# Patient Record
Sex: MALE | Race: WHITE | HISPANIC OR LATINO
[De-identification: names, ages, dates, MRNs, and addresses within clinical notes are randomized per-mention and may not be internally consistent; named-entity substitution may affect disease eponyms.]

---

## 2019-01-17 ENCOUNTER — HOSPITAL (OUTPATIENT)
Dept: OTHER | Age: 19
End: 2019-01-17

## 2019-01-17 LAB
ALBUMIN SERPL-MCNC: 3.5 GM/DL (ref 3.6–5.1)
ALBUMIN/GLOB SERPL: 1.2 {RATIO} (ref 1–2.4)
ALP SERPL-CCNC: 60 UNIT/L (ref 45–117)
ALT SERPL-CCNC: 122 UNIT/L
ANALYZER ANC (IANC): ABNORMAL
ANION GAP SERPL CALC-SCNC: 11 MMOL/L (ref 10–20)
AST SERPL-CCNC: 91 UNIT/L
BASOPHILS # BLD: 0 THOUSAND/MCL (ref 0–0.3)
BASOPHILS NFR BLD: 0 %
BILIRUB SERPL-MCNC: 0.6 MG/DL (ref 0.2–1)
BUN SERPL-MCNC: 10 MG/DL (ref 6–20)
BUN/CREAT SERPL: 9 (ref 7–25)
CALCIUM SERPL-MCNC: 8 MG/DL (ref 8.4–10.2)
CHLORIDE: 110 MMOL/L (ref 98–107)
CO2 SERPL-SCNC: 25 MMOL/L (ref 21–32)
CREAT SERPL-MCNC: 1.09 MG/DL (ref 0.67–1.17)
DIFFERENTIAL METHOD BLD: ABNORMAL
EOSINOPHIL # BLD: 0 THOUSAND/MCL (ref 0.1–0.5)
EOSINOPHIL NFR BLD: 0 %
ERYTHROCYTE [DISTWIDTH] IN BLOOD: 11.9 % (ref 11–15)
GLOBULIN SER-MCNC: 3 GM/DL (ref 2–4)
GLUCOSE SERPL-MCNC: 105 MG/DL (ref 65–99)
HEMATOCRIT: 40.7 % (ref 39–51)
HGB BLD-MCNC: 14.2 GM/DL (ref 13–17)
IMM GRANULOCYTES # BLD AUTO: 0 THOUSAND/MCL (ref 0–0.2)
IMM GRANULOCYTES NFR BLD: 1 %
LIPASE SERPL-CCNC: 180 UNIT/L (ref 73–393)
LYMPHOCYTES # BLD: 0.7 THOUSAND/MCL (ref 1.2–5.2)
LYMPHOCYTES NFR BLD: 14 %
MCH RBC QN AUTO: 31.4 PG (ref 26–34)
MCHC RBC AUTO-ENTMCNC: 34.9 GM/DL (ref 32–36.5)
MCV RBC AUTO: 90 FL (ref 78–100)
MONOCYTES # BLD: 0.9 THOUSAND/MCL (ref 0.3–0.9)
MONOCYTES NFR BLD: 19 %
NEUTROPHILS # BLD: 3 THOUSAND/MCL (ref 1.8–8)
NEUTROPHILS NFR BLD: 66 %
NEUTS SEG NFR BLD: ABNORMAL %
NRBC (NRBCRE): 0 /100 WBC
PLATELET # BLD: 150 THOUSAND/MCL (ref 140–450)
POTASSIUM SERPL-SCNC: 3.6 MMOL/L (ref 3.4–5.1)
PROT SERPL-MCNC: 6.5 GM/DL (ref 6.4–8.2)
RBC # BLD: 4.52 MILLION/MCL (ref 4.5–5.9)
SODIUM SERPL-SCNC: 142 MMOL/L (ref 135–145)
TROPONIN I SERPL HS-MCNC: <0.02 NG/ML
WBC # BLD: 4.6 THOUSAND/MCL (ref 4.2–11)

## 2019-06-13 ENCOUNTER — OFFICE VISIT (OUTPATIENT)
Dept: PEDIATRICS | Age: 19
End: 2019-06-13

## 2019-06-13 VITALS
HEIGHT: 68 IN | HEART RATE: 64 BPM | WEIGHT: 185 LBS | SYSTOLIC BLOOD PRESSURE: 120 MMHG | DIASTOLIC BLOOD PRESSURE: 65 MMHG | BODY MASS INDEX: 28.04 KG/M2

## 2019-06-13 DIAGNOSIS — Z00.00 WELL ADULT EXAM: Primary | ICD-10-CM

## 2019-06-13 PROCEDURE — 99385 PREV VISIT NEW AGE 18-39: CPT | Performed by: NURSE PRACTITIONER

## 2019-06-13 RX ORDER — FLUTICASONE PROPIONATE 50 MCG
2 SPRAY, SUSPENSION (ML) NASAL DAILY
COMMUNITY
Start: 2016-09-29

## 2019-06-13 RX ORDER — ALBUTEROL SULFATE 90 UG/1
1 AEROSOL, METERED RESPIRATORY (INHALATION)
COMMUNITY
Start: 2019-02-06

## 2019-06-13 ASSESSMENT — VISUAL ACUITY
OS_CC: 20/25
OD_CC: 20/25

## 2024-03-07 ENCOUNTER — OFFICE VISIT (OUTPATIENT)
Dept: SURGERY | Age: 24
End: 2024-03-07

## 2024-03-07 VITALS
BODY MASS INDEX: 27.92 KG/M2 | DIASTOLIC BLOOD PRESSURE: 74 MMHG | TEMPERATURE: 97.7 F | SYSTOLIC BLOOD PRESSURE: 119 MMHG | HEIGHT: 70 IN | HEART RATE: 58 BPM | WEIGHT: 195 LBS

## 2024-03-07 DIAGNOSIS — M79.651 RIGHT THIGH PAIN: Primary | ICD-10-CM

## 2024-03-07 PROCEDURE — 99203 OFFICE O/P NEW LOW 30 MIN: CPT | Performed by: SURGERY

## 2024-03-07 ASSESSMENT — ENCOUNTER SYMPTOMS
CHEST TIGHTNESS: 0
COLOR CHANGE: 0
FATIGUE: 0
TROUBLE SWALLOWING: 0
DIZZINESS: 0
WEAKNESS: 0
ABDOMINAL DISTENTION: 0
BLOOD IN STOOL: 0
SHORTNESS OF BREATH: 0
FEVER: 0
LIGHT-HEADEDNESS: 0
WOUND: 0
UNEXPECTED WEIGHT CHANGE: 0
ABDOMINAL PAIN: 0
CONSTIPATION: 0
COUGH: 0
APPETITE CHANGE: 0
VOMITING: 0
NAUSEA: 0
EYE REDNESS: 0
BACK PAIN: 0

## 2024-03-07 ASSESSMENT — PAIN SCALES - GENERAL: PAINLEVEL: 3

## 2025-04-23 ENCOUNTER — APPOINTMENT (OUTPATIENT)
Dept: CT IMAGING | Facility: HOSPITAL | Age: 25
End: 2025-04-23
Attending: EMERGENCY MEDICINE
Payer: COMMERCIAL

## 2025-04-23 ENCOUNTER — HOSPITAL ENCOUNTER (EMERGENCY)
Facility: HOSPITAL | Age: 25
Discharge: HOME | End: 2025-04-24
Attending: EMERGENCY MEDICINE
Payer: COMMERCIAL

## 2025-04-23 DIAGNOSIS — S09.90XA INJURY OF HEAD, INITIAL ENCOUNTER: Primary | ICD-10-CM

## 2025-04-23 DIAGNOSIS — S01.91XA LACERATION OF HEAD WITHOUT FOREIGN BODY, UNSPECIFIED PART OF HEAD, INITIAL ENCOUNTER: ICD-10-CM

## 2025-04-23 PROCEDURE — 12011 RPR F/E/E/N/L/M 2.5 CM/<: CPT

## 2025-04-23 PROCEDURE — 99284 EMERGENCY DEPT VISIT MOD MDM: CPT

## 2025-04-23 PROCEDURE — 70450 CT HEAD/BRAIN W/O DYE: CPT | Performed by: EMERGENCY MEDICINE

## 2025-04-24 VITALS
HEART RATE: 61 BPM | BODY MASS INDEX: 27.28 KG/M2 | TEMPERATURE: 98 F | SYSTOLIC BLOOD PRESSURE: 136 MMHG | HEIGHT: 68 IN | RESPIRATION RATE: 17 BRPM | WEIGHT: 180 LBS | DIASTOLIC BLOOD PRESSURE: 90 MMHG | OXYGEN SATURATION: 100 %

## 2025-04-24 NOTE — ED QUICK NOTES
Patient discharged home in no acute distress. A&Ox4, skin p/w/d, denies cp/sob. Ambulating with steady gait and verbalized understanding of d/c instructions

## 2025-04-24 NOTE — ED INITIAL ASSESSMENT (HPI)
Patient to ED after being hit with 110mph baseball. Pt is  and the ball hit off machine and directly to the left side of head near left ear causing 1 cm laceration. PT AO 4. Denies any LOC

## 2025-04-24 NOTE — DISCHARGE INSTRUCTIONS
Thank you for seeking care at [facility] Emergency Department.  You have been seen and evaluated after a closed head injury. The CT scan today was normal. It is possible you may have sustained a concussion.    We discussed the results of your workup   Please read the instructions provided   If given prescriptions, take as instructed    Avoid intense exercise and activities where there is a risk of another head injury - hitting the head or getting a jolt to the head - for at least 2 weeks AND until symptoms resolve. Light activity and exercise is okay, however you should rest your brain and body for 1 week. That means avoiding screen time from your phone, laptop, or tablet, etc. You should have a gradual return to regular activities and should stop if the activity is causing worsening symptoms such as headaches, confusion, lightheadedness, memory loss, vision changes or other concussion symptoms that you are having. Eat a regular diet with plenty of liquids and rest.     Remember, your care process does not end after your visit today. Please follow-up with your doctor within 1-2 days for a follow-up check to ensure you are  improving, to see if you need any further evaluation/testing, or to evaluate for any alternate diagnoses.    Please return to the emergency department if you develop severe or progressively worsening headache, persistent nausea or recurrent vomiting, obvious changes in behavior or abnormal behavior, vision changes, speech changes, difficulty walking, new or worsening numbness, tingling or weakness, fevers, feeling very ill or if any concerning or urgent problem develops or if you develop any other new or concerning symptoms as these could be signs of more serious medical illness.    Return with any signs of infection to your laceration. The steri-strips will fall off on their own.

## 2025-04-24 NOTE — ED PROVIDER NOTES
[place] Emergency Department Note  Patient: Sheldon Gonzalez Age: 24 year old Sex: male      MRN: [id number]  : 2000    Patient Seen in: [facility] Emergency Department    History     Chief Complaint   Patient presents with    Head Injury     Stated Complaint: head injury    History obtained from: patient     Otherwise healthy 24 year old male presents to the ER for evaluation after sustaining a head injury. 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  He did not pass out but did briefly fall to the ground.  He denies loss of consciousness, dizziness, lightheadedness, vision changes, lightheadedness, neck pain, numbness weakness or tingling in his extremities no other complaints.  Reports mild pain just above his ear and jaw on that side but states it is minimal.  Denies any other injuries. He believes he is up to date on his tetanus vaccine from when he was 16.     Review of Systems:  Review of Systems  Positive for stated complaint: head injury. Constitutional and vital signs reviewed. All other systems reviewed and negative except as noted above.    Patient History:  Past Medical History[1]    Past Surgical History[2]     Family History[3]    Specific Social Determinants of Health:   Short Social Hx on File[4]        PSFH elements reviewed from today and agreed except as otherwise stated in HPI.    Physical Exam     ED Triage Vitals [25 2224]   /90   Pulse 61   Resp 17   Temp 97.7 °F (36.5 °C)   Temp src Oral   SpO2 100 %   O2 Device None (Room air)       Current:/90   Pulse 61   Temp 97.7 °F (36.5 °C) (Oral)   Resp 17   Ht 172.7 cm (5' 8\")   Wt 81.6 kg   SpO2 100%   BMI 27.37 kg/m²         Physical Exam  Vitals and nursing note reviewed.   Constitutional:       General: He is not in acute distress.     Appearance:  He is not ill-appearing.   HENT:      Head: Normocephalic.      Comments: Approximately 1.5 cm well-approximated superficial linear laceration just anterior to the left ear, no active bleeding, no hematoma     Right Ear: External ear normal.      Left Ear: External ear normal.      Ears:      Comments: No hemotympanum bilaterally     Mouth/Throat:      Mouth: Mucous membranes are moist.      Pharynx: Oropharynx is clear.   Eyes:      Extraocular Movements: Extraocular movements intact.      Conjunctiva/sclera: Conjunctivae normal.      Pupils: Pupils are equal, round, and reactive to light.   Cardiovascular:      Rate and Rhythm: Normal rate and regular rhythm.      Heart sounds: No murmur heard.     Comments: 2+ radial and dp pulses bilaterally   Pulmonary:      Effort: Pulmonary effort is normal. No respiratory distress.      Breath sounds: No wheezing or rales.   Abdominal:      General: There is no distension.      Palpations: Abdomen is soft.      Tenderness: There is no abdominal tenderness. There is no guarding or rebound.   Musculoskeletal:         General: No deformity.      Cervical back: Normal range of motion and neck supple. No rigidity or tenderness.   Skin:     General: Skin is warm and dry.      Capillary Refill: Capillary refill takes less than 2 seconds.   Neurological:      General: No focal deficit present.      Mental Status: He is alert and oriented to person, place, and time.      Cranial Nerves: No cranial nerve deficit.      Comments: Strength is 5/5 bilateral upper extremities on handgrip, elbow flexion/extension, shoulder lateral abduction/adduction, bilateral lower extremities on hip flexion and ankle plantar/dorsiflexion. SILT bilat UE and LE throughout and symmetric. Finger to nose intact bilaterally.          ED Course   Labs:   Labs Reviewed - No data to display  Radiology findings:    No results found.      MDM   This patient presents with head injury after being struck by a  baseball.  Denies significant complaints right now, has small superficial linear laceration anterior to his ear as described, states he is up-to-date on tetanus    Differential diagnoses considered includes, but is not limited to:   Laceration  Closed head injury  Concussion  Skull fracture  Less likely but considered ICH given injury mechanism     Will obtain the following tests: CT head  Will administer the following medications/therapies: pt declines pain meds     Please see ED course for my independent review of these tests/imaging results.    Chronic conditions affecting care: n/a     Workup and medications considered but not ordered: n/a     Social Determinants of Health that impacted care: n/a     ED Course as of 04/24/25 0209  ------------------------------------------------------------  Time: 04/24 0041  Comment: On my independent interpretation of pt's head CT: I do not see any intracranial hemorrhage/SDH, no skull fracture. Waiting on radiology read   ------------------------------------------------------------  Time: 04/24 0053  Comment: CT HEAD WITHOUT IV CONTRAST        IMPRESSION:  CT HEAD:  -No evidence of acute intracranial abnormality.  -No acute calvarial fracture.    ------------------------------------------------------------  Time: 04/24 0053  Comment: Patient updated with results, counseled on wound cares and signs of concussion, strict return precautions and to follow-up with his primary doctor in the next couple of days.  He verbalized understanding            Procedures:  Procedures    Procedure: Laceration repair  Verbal consent was obtained from the patient.  The 1.5 cm laceration located anterior to the left ear was scrubbed/irrigated and explored.   There were no deep structures involved.  No connective tissue injury noted.  The wound was repaired with skin glue and three steristrips.  The wound repair was simple.  The procedure was performed by myself.        Disposition and Plan      Clinical Impression:  1. Injury of head, initial encounter    2. Laceration of head without foreign body, unspecified part of head, initial encounter        Disposition:  Discharge    Follow-up:  Gladys Rangel DO  [address] [facility] [address]  [phone]    Schedule an appointment as soon as possible for a visit in 2 day(s)      [facility] Emergency Department  [address] [facility] [id number] [phone]  Go to  If symptoms worsen, right away    [facility]  [address] [address]  [phone]  Schedule an appointment as soon as possible for a visit in 1 week(s)        Medications Prescribed:  There are no discharge medications for this patient.        This note may have been created using voice dictation technology and may include inadvertent errors.      Anca Lopes DO  Attending Physician   Emergency Medicine              [1] History reviewed. No pertinent past medical history.  [2] History reviewed. No pertinent surgical history.  [3] No family history on file.  [4]   Social History  Socioeconomic History    Marital status: Single   Tobacco Use    Smoking status: Never   Vaping Use    Vaping status: Never Used   Substance and Sexual Activity    Alcohol use: Never    Drug use: Never